# Patient Record
Sex: FEMALE | Race: WHITE | NOT HISPANIC OR LATINO | URBAN - METROPOLITAN AREA
[De-identification: names, ages, dates, MRNs, and addresses within clinical notes are randomized per-mention and may not be internally consistent; named-entity substitution may affect disease eponyms.]

---

## 2018-01-08 ENCOUNTER — EMERGENCY (EMERGENCY)
Facility: HOSPITAL | Age: 58
LOS: 1 days | Discharge: HOME | End: 2018-01-08
Attending: EMERGENCY MEDICINE | Admitting: EMERGENCY MEDICINE
Payer: COMMERCIAL

## 2018-01-08 VITALS
OXYGEN SATURATION: 98 % | TEMPERATURE: 98 F | HEIGHT: 65 IN | DIASTOLIC BLOOD PRESSURE: 86 MMHG | WEIGHT: 190.04 LBS | HEART RATE: 75 BPM | SYSTOLIC BLOOD PRESSURE: 146 MMHG | RESPIRATION RATE: 16 BRPM

## 2018-01-08 LAB
ALBUMIN SERPL ELPH-MCNC: 3.7 G/DL — SIGNIFICANT CHANGE UP (ref 3.3–5)
ALP SERPL-CCNC: 74 U/L — SIGNIFICANT CHANGE UP (ref 40–120)
ALT FLD-CCNC: 40 U/L — SIGNIFICANT CHANGE UP (ref 12–78)
ANION GAP SERPL CALC-SCNC: 8 MMOL/L — SIGNIFICANT CHANGE UP (ref 5–17)
AST SERPL-CCNC: 28 U/L — SIGNIFICANT CHANGE UP (ref 15–37)
BILIRUB SERPL-MCNC: 1.5 MG/DL — HIGH (ref 0.2–1.2)
BUN SERPL-MCNC: 13 MG/DL — SIGNIFICANT CHANGE UP (ref 7–23)
CALCIUM SERPL-MCNC: 8.4 MG/DL — LOW (ref 8.5–10.1)
CHLORIDE SERPL-SCNC: 106 MMOL/L — SIGNIFICANT CHANGE UP (ref 96–108)
CO2 SERPL-SCNC: 26 MMOL/L — SIGNIFICANT CHANGE UP (ref 22–31)
CREAT SERPL-MCNC: 0.76 MG/DL — SIGNIFICANT CHANGE UP (ref 0.5–1.3)
D DIMER BLD IA.RAPID-MCNC: <150 NG/ML DDU — SIGNIFICANT CHANGE UP
GLUCOSE SERPL-MCNC: 104 MG/DL — HIGH (ref 70–99)
HCT VFR BLD CALC: 38.4 % — SIGNIFICANT CHANGE UP (ref 34.5–45)
HGB BLD-MCNC: 13.1 G/DL — SIGNIFICANT CHANGE UP (ref 11.5–15.5)
INR BLD: 0.96 RATIO — SIGNIFICANT CHANGE UP (ref 0.88–1.16)
MCHC RBC-ENTMCNC: 32 PG — SIGNIFICANT CHANGE UP (ref 27–34)
MCHC RBC-ENTMCNC: 34 GM/DL — SIGNIFICANT CHANGE UP (ref 32–36)
MCV RBC AUTO: 94.3 FL — SIGNIFICANT CHANGE UP (ref 80–100)
PLATELET # BLD AUTO: 132 K/UL — LOW (ref 150–400)
POTASSIUM SERPL-MCNC: 3.8 MMOL/L — SIGNIFICANT CHANGE UP (ref 3.5–5.3)
POTASSIUM SERPL-SCNC: 3.8 MMOL/L — SIGNIFICANT CHANGE UP (ref 3.5–5.3)
PROT SERPL-MCNC: 6.6 G/DL — SIGNIFICANT CHANGE UP (ref 6–8.3)
PROTHROM AB SERPL-ACNC: 10.4 SEC — SIGNIFICANT CHANGE UP (ref 9.8–12.7)
RBC # BLD: 4.08 M/UL — SIGNIFICANT CHANGE UP (ref 3.8–5.2)
RBC # FLD: 12.4 % — SIGNIFICANT CHANGE UP (ref 10.3–14.5)
SODIUM SERPL-SCNC: 140 MMOL/L — SIGNIFICANT CHANGE UP (ref 135–145)
TROPONIN I SERPL-MCNC: <.015 NG/ML — SIGNIFICANT CHANGE UP (ref 0.01–0.04)
WBC # BLD: 5.2 K/UL — SIGNIFICANT CHANGE UP (ref 3.8–10.5)
WBC # FLD AUTO: 5.2 K/UL — SIGNIFICANT CHANGE UP (ref 3.8–10.5)

## 2018-01-08 PROCEDURE — 99284 EMERGENCY DEPT VISIT MOD MDM: CPT

## 2018-01-08 PROCEDURE — 93010 ELECTROCARDIOGRAM REPORT: CPT

## 2018-01-08 NOTE — ED ADULT NURSE NOTE - OBJECTIVE STATEMENT
pt reports had palpitations, went to stand by UC and had EKG sent to ED for further eval. states no CP/SHOB/palps at this time, lung and heart sounds normal

## 2018-01-09 VITALS
RESPIRATION RATE: 19 BRPM | SYSTOLIC BLOOD PRESSURE: 134 MMHG | HEART RATE: 72 BPM | DIASTOLIC BLOOD PRESSURE: 54 MMHG | OXYGEN SATURATION: 100 %

## 2018-01-09 LAB
TROPONIN I SERPL-MCNC: <.015 NG/ML — SIGNIFICANT CHANGE UP (ref 0.01–0.04)
TROPONIN I SERPL-MCNC: <.015 NG/ML — SIGNIFICANT CHANGE UP (ref 0.01–0.04)

## 2018-01-09 PROCEDURE — 85379 FIBRIN DEGRADATION QUANT: CPT

## 2018-01-09 PROCEDURE — 93005 ELECTROCARDIOGRAM TRACING: CPT

## 2018-01-09 PROCEDURE — 85610 PROTHROMBIN TIME: CPT

## 2018-01-09 PROCEDURE — 36415 COLL VENOUS BLD VENIPUNCTURE: CPT

## 2018-01-09 PROCEDURE — 85027 COMPLETE CBC AUTOMATED: CPT

## 2018-01-09 PROCEDURE — 80053 COMPREHEN METABOLIC PANEL: CPT

## 2018-01-09 PROCEDURE — 84484 ASSAY OF TROPONIN QUANT: CPT

## 2018-01-09 PROCEDURE — 71045 X-RAY EXAM CHEST 1 VIEW: CPT

## 2018-01-09 PROCEDURE — 71045 X-RAY EXAM CHEST 1 VIEW: CPT | Mod: 26

## 2018-01-09 PROCEDURE — 99284 EMERGENCY DEPT VISIT MOD MDM: CPT | Mod: 25

## 2018-01-09 NOTE — ED PROVIDER NOTE - MEDICAL DECISION MAKING DETAILS
pt with palpitations,normal ekg, no cardiac of pe risk factors, dd negative, check trop x 2,  d/c in neg and no arrythmia during stay, ellen rodríguez

## 2018-01-09 NOTE — ED PROVIDER NOTE - CHPI ED SYMPTOMS NEG
no syncope/no nausea/no dizziness/no fever/no vomiting/no back pain/no shortness of breath/no cough/no chest pain/no diaphoresis/no chills

## 2018-01-09 NOTE — ED PROVIDER NOTE - OBJECTIVE STATEMENT
pt is a 56 yo female awoke this am with racing palpitations, no dizziness, chest pain, shortness of breath, f/c, n/v, leg pain or swelling. pt denies prior such sx. no anxiety.  pt states sx constant.  no cardiac risk factors. no a/a factors

## 2020-02-27 NOTE — ED ADULT NURSE NOTE - GASTROINTESTINAL WDL
This will be her last prescription.  Please make sure that she schedule an appointment to establish care with new doctor.  She has been dismissed from our practice due to multiple no shows.   Abdomen soft, nontender, nondistended, bowel sounds present in all 4 quadrants.

## 2020-10-19 NOTE — ED ADULT TRIAGE NOTE - NSWEIGHTCALCTOOLDRUG_GEN_A_CORE
"Ellyn Mcgee is a 48 year old female who is being evaluated via a billable video visit.      The patient has been notified of following:     \"This video visit will be conducted via a call between you and your physician/provider. We have found that certain health care needs can be provided without the need for an in-person physical exam.  This service lets us provide the care you need with a video conversation.  If a prescription is necessary we can send it directly to your pharmacy.  If lab work is needed we can place an order for that and you can then stop by our lab to have the test done at a later time.    Video visits are billed at different rates depending on your insurance coverage.  Please reach out to your insurance provider with any questions.    If during the course of the call the physician/provider feels a video visit is not appropriate, you will not be charged for this service.\"    Patient has given verbal consent for Video visit? Yes  How would you like to obtain your AVS? MyChart  If you are dropped from the video visit, the video invite should be resent to: Text to cell phone: see Epic  Will anyone else be joining your video visit? No      Telemedicine Visit: The patient's condition can be safely assessed and treated via synchronous audio and visual telemedicine encounter.      Reason for Telemedicine Visit: Covid-19 Pandemic    Originating Site (Patient Location): Patient's home    Distant Site (Provider Location): Provider Remote Setting    Consent:  The patient/guardian has verbally consented to: the potential risks and benefits of telemedicine (video visit) versus in person care; bill my insurance or make self-payment for services provided; and responsibility for payment of non-covered services.     Mode of Communication:  Video Conference via Precogn[employer]    As the provider I attest to compliance with applicable laws and regulations related to telemedicine.        Outpatient Psychiatric " "Progress Note    Name: Ellyn Mcgee   : 1972                    Primary Care Provider: Dorothy Guaman DO   Therapist: CHOLO Lewis/Keesha Eller     PHQ-9 scores:  PHQ-9 SCORE 2020   PHQ-9 Total Score - - -   PHQ-9 Total Score MyChart 16 (Moderately severe depression) 7 (Mild depression) 8 (Mild depression)   PHQ-9 Total Score 16 7 8       TEE-7 scores:  TEE-7 SCORE 2020   Total Score - - -   Total Score - 14 (moderate anxiety) 12 (moderate anxiety)   Total Score 13 14 12       Patient Identification:  Patient is a 48 year old,   White American female  who presents for return visit with me.  Patient is currently employed part time. Patient attended the phone/video session alone. Patient prefers to be called: \"Ellyn\".    Interim History:  I last saw Ellyn Mcgee for outpatient psychiatry Consultation on 2020. During that appointment, we:      Continue Lexapro 10 mg daily for mood and anxiety    Increase Effexor-XR to 112.5 mg daily for mood and anxiety    Recommend therapy for additional support.     Today, patient reports she is 1 day shy of a hitting 30 weeks in her pregnancy.  Overall things are going okay.  She is starting to feel very uncomfortable physically.  She still able to move around some but gets fatigued and experiences some pain quite quickly.  Mood overall remains stable.  Anxiety is high at times but she is focusing very hard on nonmedication coping skills and strategies.  She continues to work hard in individual therapy.  She also reports she is doing couples therapy with her .  Sleep is difficult at times due to pregnancy and some discomfort.  Overall feels like her medication is in a good place.  She sometimes feels like she gets a little more anxious about an hour after taking her venlafaxine.  She does not seem to get as anxious if she forgets the 37.5 mg pill.  She is going to trial taking just the 75 mg pill on " "some days.  Would like to continue to focus on therapy without increasing medication.  Denies any thoughts of suicide.  No drug or alcohol use.    Per [facility] [place], Dr. Vincenzo Edward, during today's team-based visit:  Patient reported that she is feeling \"fine\" overall. She noted continuing to have moments of feeling reactive to situations. She said that she is able to maintain for a little longer than in the past but still has these moments. She has been working with a new therapist and noted that \"it's still early\" to determine how helpful it will be. She described these moments as smaller moments of \"panic\" but they are less frequent and less intense. \"I have more tolerance toward them now.\" She added that she does not believe more medications will help her depression and is putting her efforts into therapy to obtain some help with those symptoms. She spoke about the challenges with medication for anxiety while pregnant as she wants to be able to safely breastfeed.     Psychiatric ROS:  Ellyn Mcgee reports mood has been: Stable  Anxiety has been: Stable, heightened at times  Sleep has been: Difficult due to pregnancy  Isabel sxs: None  Psychosis sxs: None  ADHD/ADD sxs: manageable without meds  PTSD sxs: None  PHQ9 and GAD7 scores were reviewed today.   Medication side effects:  See HPI  Current stressors include: Pregnancy at age 48  Coping mechanisms and supports include: Therapy, Family, Hobbies and Friends    Current medications include:   Current Outpatient Medications   Medication Sig     albuterol (PROAIR HFA/PROVENTIL HFA/VENTOLIN HFA) 108 (90 Base) MCG/ACT inhaler Inhale 2 puffs into the lungs every 6 hours     escitalopram (LEXAPRO) 10 MG tablet Take 10 mg by mouth daily     IBUPROFEN as needed.     metroNIDAZOLE (FLAGYL) 500 MG tablet Take 1 tablet (500 mg) by mouth 2 times daily     miconazole (EQ MICONAZOLE 7) 2 % cream Place 1 applicator vaginally At Bedtime     Prenatal Vit-Fe Fumarate-FA (PRENATAL PO)      " venlafaxine (EFFEXOR-XR) 37.5 MG 24 hr capsule Take 1 capsule (37.5 mg) by mouth daily Take with 75 mg cap for total daily dose 112.5 mg.     venlafaxine (EFFEXOR-XR) 75 MG 24 hr capsule Take 1 capsule (75 mg) by mouth daily Take with 37.5 mg cap for total daily dose 112.5 mg.     No current facility-administered medications for this visit.        Past Medical/Surgical History:  Past Medical History:   Diagnosis Date     Abnormal Pap smear     Colposcopy     Adjustment disorder with mixed anxiety and depressed mood 4/4/2012     Anemia     In Past     Anxiety      Chlamydia trachomatis infection of other specified site 1993     Depression      Fertility problem      Lyme disease 9/8/2010    ?false positive vs positve CMV - resolved     Moderate dysplasia of cervix 1995     Other and unspecified adverse effect of drug, medicinal and biological substance     insulin resistent     Varicosities      Wounds and injuries     fall down stairs, PT for back, pain meds      has a past medical history of Abnormal Pap smear, Adjustment disorder with mixed anxiety and depressed mood (4/4/2012), Anemia, Anxiety, Chlamydia trachomatis infection of other specified site (1993), Depression, Fertility problem, Lyme disease (9/8/2010), Moderate dysplasia of cervix (1995), Other and unspecified adverse effect of drug, medicinal and biological substance, Varicosities, and Wounds and injuries. She also has no past medical history of Asthma, Asymptomatic human immunodeficiency virus (HIV) infection status (H), Breast disorder, Chronic kidney disease, Complication of anesthesia, Diabetes mellitus (H), Heart disease, Herpes simplex without mention of complication, Hypertension, Liver disease, Postpartum depression, Rh incompatibility, Seizures (H), Sickle cell anemia (H), Systemic lupus erythematosus (H), or Thyroid disease.    Social History:  Reviewed. No changes to social history.     Vital Signs:   None. This is phone/video visit.      Labs:  Most recent laboratory results reviewed and the pertinent results include:   Unremarkable UA 10/6/2020  Normal glucose tolerance test 1 month ago    Review of Systems:  10 systems (general, cardiovascular, respiratory, eyes, ENT, endocrine, GI, , M/S, neurological) were reviewed. Most pertinent finding(s) is/are: Aches and pains due to pregnancy. The remaining systems are all unremarkable.    Mental Status Examination (limited as this is by phone/video):  Appearance: Awake, alert, appears stated age, adequate hygiene/grooming, no acute distress  Attitude:  cooperative, pleasant  Motor: No gross abnormalities observed via video, not formally tested  Oriented to:  person, place, time, and situation  Attention Span and Concentration:  normal  Speech:  clear, coherent, regular rate, rhythm, and volume  Language: intact  Mood:  Okay  Affect:  appropriate and in normal range and mood congruent  Associations:  no loose associations  Thought Process:  logical, linear and goal oriented  Thought Content:  no evidence of suicidal ideation or homicidal ideation, no evidence of psychotic thought, no auditory hallucinations present and no visual hallucinations present  Recent and Remote Memory:  Intact to interview. Not formally assessed. No amnesia.  Fund of Knowledge: appropriate  Insight:  good  Judgment:  intact, adequate for safety  Impulse Control:  intact    Suicide Risk Assessment:  Today Ellyn Mcgee reports no suicidal ideation. Based on all available evidence including the factors cited above, Ellyn Mcgee does not appear to be at imminent risk for self-harm, does not meet criteria for a 72-hr hold, and therefore remains appropriate for ongoing outpatient level of care.  A thorough assessment of risk factors related to suicide and self-harm have been reviewed and are noted above. The patient convincingly denies suicidality on several occasions. Local community safety resources printed and reviewed for  patient to use if needed. There was no deceit detected, and the patient presented in a manner that was believable.     DSM5 Diagnosis:  Major Depressive Disorder, Recurrent Episode, In Partial Remission  300.02 (F41.1) Generalized Anxiety Disorder   ADHD, Unspecified    Medical comorbidities include:   Patient Active Problem List    Diagnosis Date Noted     Major depressive disorder, recurrent episode, moderate (H) 10/08/2020     Priority: Medium     High-risk pregnancy, elderly multigravida, unspecified trimester 06/05/2020     Priority: Medium     TEE (generalized anxiety disorder) 10/10/2018     Priority: Medium     Cervical radiculopathy 04/16/2018     Priority: Medium     Attention deficit hyperactivity disorder (ADHD), predominantly inattentive type 10/04/2017     Priority: Medium     Patient is followed by PREMA MARIEE for ongoing prescription of stimulants.  All refills should be approved by this provider, or covering partner.    Medication(s): Concerta 27mg on weekend days and 36mg other days of the week  Maximum quantity per month: 30  Clinic visit frequency required: Q 6  months     Controlled substance agreement on file: Yes       Date(s): 10/4/17  Neuropsych evaluation for ADD completed:  Yes, completed 8/17/17, on file and diagnosis confirmed    Last [facility] website verification: 12/3/2019   [email or web address]           External hemorrhoids 04/25/2012     Priority: Medium     PCOS (polycystic ovarian syndrome) 02/22/2011     Priority: Medium     Hyperlipidemia LDL goal <130 09/05/2008     Priority: Medium     Anxiety state 09/05/2008     Priority: Medium     Problem list name updated by automated process. Provider to review    Patient is followed by PREMA MARIEE for ongoing prescription of benzodiazepines.  All refills should be approved by this provider, or covering partner.    Medication(s): Xanax.   Maximum quantity per month:   Clinic visit frequency required:      Controlled substance  agreement on file: Yes       Date(s): 10/4/2017  Benzodiazepine use reviewed by psychiatry:      Last [facility] website verification:  done on 12/17/2018   [email or web address]             Psychosocial & Contextual Factors: See HPI above    Assessment:  Per Intake Note with me 9/8/20:  Ellyn Mcgee is a 48-year-old female who is 23 weeks pregnant with a past psychiatric history including depression, anxiety, and ADHD who presents today for psychiatric evaluation.  Her depression and anxiety date back several years and she has also had postpartum depression/anxiety with her now 8-year-old (she also has a 14-year-old but did not experience postpartum mental health issues at that time).  She started sertraline during her second pregnancy and then ended up being maintained on Paxil-CR for several years.  She is also more recently diagnosed with ADHD and maintained on Concerta.  She weaned off both Paxil and Concerta when she found out she was pregnant and replaced these medications with her current regimen of Lexapro and Effexor-XR to decrease risk of fetal defects.  For the most part, she is feeling a little bit better on her current doses of Lexapro 10 mg and Effexor-XR 75 mg.  It is an unconventional combination as we discussed, but since she is doing quite well, I am hesitant to make many changes.  She feels as though her anxiety could be a little bit better controlled and so we will further increase Effexor-XR to 112.5 mg daily to be taken with her Lexapro 10 mg daily.  If she does a lot better, we can consider decreasing Lexapro to 5 mg daily. We discussed the risk of serotonin syndrome and she will continue to be vigilant for any signs or symptoms of this condition.  We did discuss the possibility of restarting a stimulant medication if necessary.  She does not feel as though her ADHD symptoms are too severe at this time.    Today, 10/19, she reports overall doing okay today.  Feels like her mood is not  necessarily the best it has ever been but has been quite stable and overall improved since last couple visits.  Her anxiety is mostly manageable.  She is going to try decreasing her venlafaxine on some days to see if that helps improve some anxiety and low frustration tolerance she experiences about an hour after taking her dose.  We discussed trying to split her dose as an option as well.  She was encouraged to continue her good work in therapy.  No med changes today except that she might trial a lower dose of venlafaxine on some days.  Also discussed Escitalopram and venlafaxine's relative safety with breast-feeding.  We did discuss that it is transmitted through breast milk but that ultimately there is little evidence to show it is harmful to the baby.  Discussed monitoring baby's sleep and weight carefully.  Also discussed that it is okay to supplement or strictly formula feed if there are too many concerns.    Medication side effects and alternatives were reviewed. Health promotion activities recommended and reviewed today. All questions addressed. Education and counseling completed regarding risks and benefits of medications and psychotherapy options. Recommend ongoing therapy for additional support.     Treatment Plan:    Continue Lexapro/escitalopram 10 mg daily for mood and anxiety    Continue venlafaxine 112.5 mg daily for mood and anxiety.  You can trial just 75 mg on some days to see if that helps with some of the increased anxiety and irritability after you take your dose.  Could also try taking 37.5 mg at one time of the day and 75 mg at another time.    Continue all other medications as reviewed per electronic medical record today.     Safety plan reviewed. To the Emergency Department as needed or call after hours crisis line at [phone] or [phone]. [place] Crisis Text Line. Text [place] to 067195 or Suicide LifeLine Chat: suicidepreventionlifeline.org/chat    Continue therapy as planned.      Schedule an appointment with me in 8 weeks or sooner as needed. Call [facility] at [phone] to schedule if someone does not reach out to you within 2-3 days.     Follow up with primary care provider as planned or for acute medical concerns.    Call the psychiatric nurse line with medication questions or concerns at [phone].    MyChart may be used to communicate with your provider, but this is not intended to be used for emergencies.    [facility]- Psychiatric Disorders During Pregnancy  [email or web address]    [facility]  [email or web address]    Administrative Billing:   Phone Call/Video Duration: 27 Minutes  Start: 1:23p  Stop: 1:50p    Time spent with patient was 27 minutes and greater than 50% of time or 15 minutes was spent in counseling and coordination of care regarding above diagnoses and treatment plan. Complexity high due to high risk pregnancy due to maternal age. Also on psychiatric medication and hx of postpartum depression.     Patient Status:  Patient will continue to be seen for ongoing consultation and stabilization.    Signed:   Sherlyn Wang DO  [facility]    used

## 2021-01-07 ENCOUNTER — EMERGENCY (EMERGENCY)
Facility: HOSPITAL | Age: 61
LOS: 1 days | End: 2021-01-07
Attending: EMERGENCY MEDICINE
Payer: COMMERCIAL

## 2021-01-07 VITALS
SYSTOLIC BLOOD PRESSURE: 120 MMHG | OXYGEN SATURATION: 91 % | HEART RATE: 72 BPM | RESPIRATION RATE: 17 BRPM | TEMPERATURE: 97 F | DIASTOLIC BLOOD PRESSURE: 71 MMHG

## 2021-01-07 VITALS
HEIGHT: 65 IN | RESPIRATION RATE: 18 BRPM | WEIGHT: 250 LBS | TEMPERATURE: 97 F | DIASTOLIC BLOOD PRESSURE: 78 MMHG | OXYGEN SATURATION: 91 % | HEART RATE: 92 BPM | SYSTOLIC BLOOD PRESSURE: 123 MMHG

## 2021-01-07 LAB — SARS-COV-2 RNA SPEC QL NAA+PROBE: DETECTED

## 2021-01-07 PROCEDURE — 99283 EMERGENCY DEPT VISIT LOW MDM: CPT | Mod: 25

## 2021-01-07 PROCEDURE — 99283 EMERGENCY DEPT VISIT LOW MDM: CPT

## 2021-01-07 PROCEDURE — U0005: CPT

## 2021-01-07 PROCEDURE — U0003: CPT

## 2021-01-07 RX ORDER — DEXAMETHASONE 0.5 MG/5ML
1 ELIXIR ORAL
Qty: 4 | Refills: 0
Start: 2021-01-07 | End: 2021-01-10

## 2021-01-07 RX ORDER — DEXAMETHASONE 0.5 MG/5ML
6 ELIXIR ORAL ONCE
Refills: 0 | Status: COMPLETED | OUTPATIENT
Start: 2021-01-07 | End: 2021-01-07

## 2021-01-07 RX ADMIN — Medication 6 MILLIGRAM(S): at 17:10

## 2021-01-07 NOTE — ED PROVIDER NOTE - PHYSICAL EXAMINATION
Constitutional: Awake, Alert, non-toxic. NAD. Well appearing, well nourished.   HEAD: Normocephalic, atraumatic.   EYES: EOM intact, conjunctiva and sclera are clear bilaterally.   ENT: No rhinorrhea, patent, mucous membranes pink/moist, no drooling or stridor.   NECK: Supple, non-tender  CARDIOVASCULAR: Normal S1, S2; regular rate and rhythm.  RESPIRATORY: Normal respiratory effort; breath sounds CTAB, no wheezes, rhonchi, or rales. Speaking in full sentences. No accessory muscle use.   ABDOMEN: Soft; non-tender, non-distended.   EXTREMITIES: Full passive and active ROM in all extremities; non-tender to palpation; distal pulses palpable and symmetric, no LE edema.  SKIN: Warm, dry; good skin turgor, no apparent lesions or rashes, no ecchymosis, brisk capillary refill.  NEURO: A&O x3. Sensory and motor functions are grossly intact. Speech is normal. Appearance and judgement seem appropriate for gender and age.

## 2021-01-07 NOTE — ED ADULT TRIAGE NOTE - AS HEIGHT TYPE
stated
decreased ability to use arms for pushing/pulling/decreased ability to use legs for bridging/pushing

## 2021-01-07 NOTE — ED PROVIDER NOTE - NSFOLLOWUPINSTRUCTIONS_ED_ALL_ED_FT
You are being sent home at this time, but you need to put yourself on HOME ISOLATION.  It is currently recommended at this time that you isolate for the next 14 days unless further instructions are provided at a later date.  When home on home isolation please try to use your own bathroom and bedroom, in an effort to prevent the spread to anyone in your household.  Continue Tylenol per label instructions as needed for fever and body aches  Advance activity as tolerated  Please return to the ED for increased difficulty breathing or signs of respiratory distress, unable to eat / drink, dizziness , passing out, chest pains, or any other concerning symptoms.          The following are general guidelines:  •Stay home if you are sick or think you may have COVID-19. It is important to stay home if you are waiting for a testing appointment or for test results. Even if you do not have symptoms, you can pass the virus to others.      •Limit trips out of your home. You may be able to have food, medicines, and other supplies delivered. If possible, have delivered items left at your door or other area. Try not to have someone hand you an item. You will be so close to the person that the virus can spread between you. Plan trips out of your home. Think about how many people you may have contact with, and for how long. Plan your route so you make the fewest stops possible to limit contact. Keep track of places you go and anyone you have contact with. This will help contact tracers notify others if you become infected.      •Do not have close physical contact with anyone who does not live in your home. Do not shake hands with, hug, or kiss a person as a greeting. Stand or walk as far from others as possible. If you must use public transportation (such as a bus or subway), try to sit or stand away from others. You can stay safely connected with others through phone calls, e-mail messages, social media websites, and video chats. Check in on anyone who may be having a hard time socially distancing, or who lives alone. Ask administrators at nursing homes or long-term care facilities how you can safely communicate with someone living there.      •Wear a face covering (mask) around anyone who does not live in your home. A covering helps protect the person wearing it from being infected or passing the virus to others. Do not wear a plastic face shield instead of a covering. You can use both together for extra protection. Use a disposable non-medical mask, or make a cloth covering with at least 2 layers. Cover your mouth and your nose. Securely fasten it under your chin and on the sides of your face. A face covering is not a substitute for other safety measures. Continue social distancing and washing your hands often. Do not put coverings on children younger than 2 years or on anyone who has breathing problems or cannot remove it. Talk to your healthcare provider if you or someone you care for cannot wear a face covering.      •Only allow medical professionals or other necessary helpers into your home. Wear your face covering, and remind others to wear a face covering. Remind them to wash their hands when they arrive and before they leave. Do not let a visitor into your home, even if the person is not sick. A person can pass the virus to others before symptoms of COVID-19 begin. Some people never even develop symptoms. Children commonly have mild symptoms or no symptoms. It may be hard to tell a child not to hug or kiss you. Explain that this is how he or she can help you stay healthy.      •Do not go to someone else's home unless it is necessary. Do not go over to visit, even if the person is lonely. Only go if you need to help him or her. Make sure you both wear face coverings while you are there.      •Avoid large gatherings and crowds. Gatherings or crowds of 10 or more individuals can cause the virus to spread. Examples of gatherings include parties, holiday meals, sporting events, Advent services, and conferences. Crowds may form at beaches, justice, and tourist attractions. Protect yourself by staying away from large gatherings and crowds.      •Ask your healthcare provider for other ways to have appointments. You may be able to have appointments without having to go into the provider's office. Some providers offer phone, video, or other types of appointments. You may also be able to get prescriptions for a few months of your medicines at a time.      •Stay safe if you must go out to work. You may have a job that can only be done outside your home. Keep physical distance between you and other workers as much as possible. Follow your employer's rules so everyone stays safe.      If you have COVID-19 and are recovering at home: Healthcare providers will give you specific instructions to follow. The following are general guidelines to remind you how to keep others safe until you are well:   •Wash your hands often. Use soap and water as much as possible. You can use hand  that contains alcohol if soap and water are not available. Do not share towels with anyone. If you use paper towels, throw them away in a lined trash can kept in your room or area. Use a covered trash can, if possible.      •Do not go out of your home unless it is necessary. You may have to go to your healthcare provider's office for check-ups or to get prescription refills. Do not arrive at the provider's office without an appointment. Providers have to make their offices safe for staff and other patients. If possible, ask someone who is not infected to go out for what you need. This includes groceries, medicines, and household items.      •Only have close physical contact with a person giving direct care, or a baby or child you must care for. Family members and friends should not visit you. If possible, stay in a separate area or room of your home if you live with others. No one should go into the area or room except to give you care. You can visit with others by phone, video chat, e-mail, or similar systems. It is important to stay connected with others in your life while you recover.      •Wear a face covering while others are near you. This can help prevent droplets from spreading the virus when you talk, sneeze, or cough. Put the covering on before anyone comes into your room or area. Remind the person to cover his or her nose and mouth before going in to provide care for you.      •Do not share items. Do not share dishes, towels, or other items with anyone. Items need to be washed after you use them.      •Protect your baby. Wash your hands with soap and water often throughout the day. Wear a clean face covering while you breastfeed, or while you express or pump breast milk. If possible, ask someone who is well to care for your baby. You can put breast milk in bottles for the person to use, if needed. Talk to your healthcare provider if you have any questions or concerns about caring for or bonding with your baby. He or she will tell you when to bring your baby in for check-ups and vaccines. He or she will also tell you what to do if you think your baby was infected with the new virus.      •Do not handle live animals unless it is necessary. Until more is known, it is best not to touch, play with, or handle live animals. Some animals, including pets, have been infected with the new coronavirus. Do not handle or care for animals until you are well. Care includes feeding, petting, and cuddling your pet. Do not let your pet lick you or share your food. Ask someone who is not infected to take care of your pet, if possible. If you must care for a pet, wear a face covering. Wash your hands before and after you give care. Talk to your healthcare provider about how to keep a service animal safe, if needed.      •Follow directions from your healthcare provider for being around others after you recover. It is not known for sure if or for how long a recovered person can pass the virus to others. Your provider may give you instructions, such as continuing social distancing or wearing a face covering around others. The following are general guidelines for when you can be around others:?If you never developed any symptoms, wait at least 10 days after your positive test. Your provider may want you to have 2 negative tests in a row at least 24 hours apart. This depends on how available testing is in your area.      ?If you did have symptoms, wait at least 10 days after the symptoms first appeared. Then you will need to have no fever for 24 hours without fever medicine. Most of your symptoms will also need to be gone. A loss of taste or smell may continue for several months. It is considered okay to be around others if this is your only symptom.      ?If you were hospitalized for COVID-19 and needed oxygen, your provider will tell you how long to wait. You may need to wait until 20 days after symptoms appeared. It may be less if you have 2 negative tests in a row at least 24 hours apart. This will depend on how available testing is in your area.        How to take care of someone who has COVID-19: If the person lives in another home, arrange for a time to give care. Remember to bring a few pairs of disposable gloves and a cloth face covering. The following are general guidelines to help you safely care for anyone who has COVID-19:  •Wash your hands often. Wash before and after you go into the person's home, area, or room. Throw paper towels away in a lined trash can that has a lid, if possible.      •Help the person so he or she does not expose others to the virus. You will also help the person rest by doing housework, cooking meals, and running errands for him or her. Go out for groceries, medicines, or household items the person needs. Make sure he or she drinks extra liquids and eats healthy foods. Watch for worsening symptoms. Keep his or her healthcare provider's contact information where you can easily find it. Contact the provider if you notice a symptom is getting worse. The provider will tell you what to do.      •Do not allow others to go near the person. No one should come into the person's home unless it is necessary. If possible, the person should be in a separate area or room if he or she lives with others. Keep the room's door shut unless you need to go in or out. Have others call, video chat, or e-mail the person if he or she is feeling well enough. The person may feel lonely if he or she is kept separate for a long period of time. Safe communication can help him or her stay connected to family and friends.      •Make sure the person's room has good air flow. You may be able to open the window if the weather allows. An air conditioner can also be turned on to help air move.      •Contact the person before you go in to give care. Make sure the person is wearing a face covering. Remind him or her to wash his or her hands with soap and water. He or she can use hand  that contains alcohol if soap and water are not available. Put on a face covering before you go in to give care.      •Wear gloves while you give care and clean. Clean items the person uses often. Clean countertops, cooking surfaces, and the fronts and insides of the microwave and refrigerator. Clean the shower, toilet, the area around the toilet, the sink, the area around the sink, and faucets. Gather used laundry or bedding. Wash and dry items on the warmest settings the fabric allows. Wash dishes and silverware in hot, soapy water or in a .      •Anything you throw away needs to go into a lined trash can. When you need to empty the trash, close the open end of the lining and tie it closed. This helps prevent items the virus is on from spilling out of the trash. Remove your gloves and throw them away. Wash your hands.      •Follow your healthcare provider's instructions. You will need to quarantine while you are caring for the infected person. You should also be tested, even if you do not develop symptoms of COVID-19. These measures will help protect others you are around while you are giving care. Your provider will give you instructions for quarantining and testing.      Follow up with your doctor as directed: Write down your questions so you remember to ask them during your visits.    For more information:   •Centers for Disease Control and Prevention  [address] [place] [address]  Phone: 1-279.890.2981  Web Address: http://www.cdc.gov

## 2021-01-07 NOTE — ED PROVIDER NOTE - OBJECTIVE STATEMENT
61 y/o female without reported PMHx sent by urgent care due to SOB. pt reports her  tested positive for COVID 12/21. pt reports she became symptomatic 12/23. Pt reports she tested 12/23 in which was negative. pt reports she initially had body aches, mild cough, diarrhea, and SOB which has improved since. pt reports she went to urgent care to get tested for COVID along with antibodies in which CXR noted pneumonia and had low oxygen levels. pt reports feeling minimally SOB but comfortably. pt denies chest pain, fever, hemoptysis, leg swelling, hx of DVT/CAD, N/V/D recently, abdominal pain, or any other complaints.

## 2021-01-07 NOTE — ED PROVIDER NOTE - NSFOLLOWUPCLINICS_GEN_ALL_ED_FT
[facility] Pulmonolgy and Sleep Medicine  Pulmonology  [address] [place] [address], [facility] [address]  [place] [address]  Phone: [phone]  Fax:   Follow Up Time: 1-3 Days

## 2021-01-07 NOTE — ED PROVIDER NOTE - CLINICAL SUMMARY MEDICAL DECISION MAKING FREE TEXT BOX
sent by urgent care due to SOB. pt reports her  tested positive for COVID 12/21. pt reports she became symptomatic 12/23. Pt reports she tested 12/23 in which was negative. pt reports she initially had body aches, mild cough, diarrhea, and SOB which has improved since. pt reports she went to urgent care to get tested for COVID along with antibodies in which CXR noted pneumonia and had low oxygen levels. Vitals noted mild hypoxia. Pt was ambulated, mild hypoxia in low 90s. Pt reports she is feeling comfortable, would not like work up or admission. Requesting steroid treatment as per urgent care recommendations. Rx for decadron.

## 2021-01-07 NOTE — ED PROVIDER NOTE - PATIENT PORTAL LINK FT
You can access the FollowMyHealth Patient Portal offered by [facility] by registering at the following website: [email or web address]. By joining [employer]’s FollowMyHealth portal, you will also be able to view your health information using other applications (apps) compatible with our system.

## 2021-01-07 NOTE — ED PROVIDER NOTE - ATTENDING CONTRIBUTION TO CARE
59 yo F p/w co cough / congestion x past  ~ 2 weeks. Pt with exposure to COVID - but tested neg several days after developing sx. Now with some hypoxia - pt sent for eval. no chest pain. no abd pain. no v/d. No neck / back pain. No neck / back pain. No other acute co.  exam: MM Moist. neck supple. no meningeal signs. O2 sat 91, with 89% with exertion. Pt otherwise looking well. non-toxic, well appearing. nl resp effort. no acc muscle use. no other acute findings.  Dw pt - refusing workup- will start decadron and will fu with PMD asap. Pt will return with any changes, pt will check pulseox at home and will return with worsening / persistent hypoxia, worsening dyspnea or any other concerns.

## 2021-02-24 ENCOUNTER — NON-APPOINTMENT (OUTPATIENT)
Age: 61
End: 2021-02-24

## 2021-02-24 ENCOUNTER — APPOINTMENT (OUTPATIENT)
Dept: CARDIOLOGY | Facility: CLINIC | Age: 61
End: 2021-02-24
Payer: COMMERCIAL

## 2021-02-24 VITALS
HEART RATE: 79 BPM | OXYGEN SATURATION: 98 % | HEIGHT: 64 IN | DIASTOLIC BLOOD PRESSURE: 86 MMHG | BODY MASS INDEX: 42.51 KG/M2 | SYSTOLIC BLOOD PRESSURE: 134 MMHG | WEIGHT: 249 LBS

## 2021-02-24 DIAGNOSIS — U07.1 COVID-19: ICD-10-CM

## 2021-02-24 DIAGNOSIS — R00.2 PALPITATIONS: ICD-10-CM

## 2021-02-24 DIAGNOSIS — R03.0 ELEVATED BLOOD-PRESSURE READING, W/OUT DIAGNOSIS OF HYPERTENSION: ICD-10-CM

## 2021-02-24 PROCEDURE — 93000 ELECTROCARDIOGRAM COMPLETE: CPT

## 2021-02-24 PROCEDURE — 99072 ADDL SUPL MATRL&STAF TM PHE: CPT

## 2021-02-24 PROCEDURE — 99204 OFFICE O/P NEW MOD 45 MIN: CPT

## 2022-08-12 ENCOUNTER — APPOINTMENT (OUTPATIENT)
Dept: INTERNAL MEDICINE | Facility: CLINIC | Age: 62
End: 2022-08-12

## 2023-09-04 ENCOUNTER — TRANSCRIPTION ENCOUNTER (OUTPATIENT)
Age: 63
End: 2023-09-04

## 2025-05-01 NOTE — ED ADULT NURSE NOTE - CAS EDN DISCHARGE ASSESSMENT
Discharge Summary    Patient ID:  Yolie Roman  [id number]  69 year old  1956    Admit date: 4/23/2025    Discharge date:  5/1/2025      Admitting Physician: Jay Dang DO     Discharge Physician: Bob Titus MD    Primary Diagnoses:   Principal Problem:    CAD in native artery  Resolved Problems:    * No resolved hospital problems. *      Secondary Diagnoses:  Past Medical History:   Diagnosis Date    Anxiety     Brugada syndrome 10/05/2016    Chronic depression 03/03/2017    CKD (chronic kidney disease), stage III  (CMD) 10/14/2015    Coronary artery disease 04/23/2025    Diabetes mellitus  (CMD)     Essential (primary) hypertension     Excessive falling 01/17/2017    GERD without esophagitis 02/17/2017    Heart failure with mildly reduced ejection fraction (HFmrEF)  (CMD) 04/21/2025    Hyperlipidemia 04/15/2016    NSTEMI (non-ST elevated myocardial infarction)  (CMD) 04/21/2025    Obesity     Osteopenia 02/01/2024    Peripheral arterial disease (CMD) 03/18/2025    Presbyopia     Single Chamber ICD [employer] 10/05/2016    Syncope 10/04/2016    Vitreous floaters of right eye            Hospital Course By Problem List (see H&P for details of admission):  69-year-old female who presented to [facility] with chest pain. Found to have severe diffuse CAD requiring CABG. Transferred here for CABG with Dr. Gaona however after further evaluation patient was considered high risk for CABG and recommended high risk PTCA instead which was done and tolerated well     Today   No more confusion, oriented x 4  Improving cough with less green sputum   No more SOB and weaned off O2   No chest pain   No fever or chills   No abdominal pain, nausea or vomiting    NSTEMI with diffuse severe multivessel CAD S/P high risk multiple PTCA on 4/29/25  Ischemic cardiomyopathy  - Presented with chest pain and elevated troponin.  Underwent cardiac catheterization 4/22 showing proximal % stenosis, mid left main 70%  stenosis, mid LAD 85% stenosis and mid circumflex 50% stenosis.  - Case was discussed with Dr. Figueroa Gaona as well as cardiology, Dr. Ambriz.  Initial plan was to proceed with CABG however patient felt to be high risk, after further discussions between Cardiology and Cardiothoracic surgery decided to go for high risk PTCA which was done on 4/29/25  -Echocardiogram from 4/22 showed EF 42-48% with mid and distal anterior septal hypokinesis and grade 2 diastolic dysfunction  - Troponin peaked at 882 then down to 580.    Received heparin drip initially.  Currently without chest pain  Continue on aspirin, statin and added Plavix  Added Toprol    Continue Doxazosin   Reduced home Amlodipine to avoid hypotension   Continue Bumex   No ACEI or ARBs because of renal functions, can consider initiating as outpatient if stable   Continue to follow with Cardiology as outpatient at [facility]      Acute bronchitis and mild volume overload, resolving   Acute hypoxic respiratory failure, secondary to the above, resolved   -Presented from outside facility with cough and some dyspnea.    -  CTA chest from 4/21 commented on peripheral atelectasis.  No consolidation, effusion, mass  Sputum culture positive for hemophilus influenza    Started on Azithromycin planning to complete total of 3 days course   Resume Bumex      Metabolic encephalopathy resolved   Secondary to the above   Added Thiamine   Checked B12 and folic acid levels and okay    Avoid opiates and sedatives      Poorly controlled diabetes mellitus type 2  -Lantus increased over past few days.  Improved control now on Lantus 38 units, lispro 10 units scheduled with close monitoring of BS..    -A1c measured at 11.4  -Prior to arrival, patient was on Lantus 30 units in the morning      Essential hypertension   Continue medications as before, monitor BP     Chronic kidney disease stage III-   -Stable  Monitor I's and O's, renal function throughout course     Depression/anxiety-    Continue home medications       GERD-   Continue PPI     Brugada syndrome  Single-chamber ICD placed.  Monitor on on telemetry     Lower extremity vascular insufficiency  Claudication  Follows with vascular surgery outpatient     Depression and anxiety continue home medications                      Consults   IP Consult Orders (From admission, onward)       Start     Ordered    04/25/25 1348  Inpatient consult to Cardiology  ONE TIME        Provider:  Robert Ambriz DO    04/25/25 1347    04/24/25 0747  Inpatient consult to Cardiothoracic Surgery  ONE TIME        Provider:  Figueroa Gaona MD    04/24/25 0746                    Procedures performed [facility] LOWER EXTREMITY VENOUS DUPLEX MAPPING BILATERAL  Result Date: 4/25/2025  [facility] LOWER EXTREMITY VENOUS DUPLEX MAPPING BILATERAL INDICATION:  Preoperative vein mapping. TECHNIQUE:  Bilateral venous duplex sonography (grayscale, color flow, spectral Doppler) was performed. FINDINGS:  RIGHT LEG: Deep venous system: normally compressible throughout, with no evidence for deep vein thrombosis. No deep vein reflux identified. Superficial venous system: GSV, at SFJ: 7.5 mm GSV, proximal thigh: 7.5 mm GSV, mid thigh: 7.2 mm At lower thigh level, a large varicose tributary exits the saphenous sheath GSV, distal thigh: 3.6 mm GSV, proximal calf: 2.3 mm GSV, midcalf: 2.6 mm GSV, distal calf: 2.9 mm GSV, ankle: 2.6 mm SSV, upper calf: 1.6 mm SSV, midcalf: 1.9 mm SSV, lower calf: 1.9 mm LEFT LEG: Deep venous system: normally compressible throughout, with no evidence for deep vein thrombosis. No deep vein reflux identified. Superficial venous system: GSV, at SFJ: 12.6 mm GSV, proximal thigh: 8.9 mm GSV, mid thigh: 6.3 mm At mid to lower thigh, there is a large varicose tributary communicating with numerous varicosities GSV, distal thigh: 1.6 mm GSV, proximal calf: Varicosities only GSV, midcalf: Varicosities only GSV, distal calf: Varicosities only GSV, ankle: Varicosities  only SSV, upper calf: 1.3 mm, partial compressibility with wall thickening SSV, midcalf: 2.4 mm SSV, lower calf: 1.7 mm     IMPRESSION: Right le. No evidence for deep vein thrombosis. 2. Patent and continuous great saphenous vein from saphenofemoral junction to the ankle. The upper and mid thigh great saphenous segment is dilated with a large varicose tributary exiting lower at lower thigh. 3. Patent and continuous small saphenous vein from upper calf to ankle with minimum diameter 1.6 mm. Left le. No evidence for deep vein thrombosis. 2. Patent and continuous great saphenous vein from saphenofemoral junction to the knee. The upper and mid great saphenous segment is quite dilated, communicating with numerous subcutaneous varicosities via a mid to lower thigh varicose tributary. 3. Chronic postthrombotic wall thickening and diminutive nature of the upper aspect of the small saphenous vein suggests remote thrombotic event; the vein is of more normal caliber at mid and distal calf. Electronically Signed by: Barrett Mckeon MD Signed on: 2025 10:03 AM Created on Workstation ID: [id number] Signed on Workstation ID: [id number]    XR CHEST AP OR PA  Result Date: 2025  EXAM: XR CHEST AP OR PA INDICATION:  hypoxia/dyspnea COMPARISON: 2025. FINDINGS/    IMPRESSION:  Stable borderline prominence of the heart size and central vasculature, which can be seen with cardiac decompensation. Left-sided pacemaker remains in place. There is mild interstitial prominence without evidence of dense focal consolidation or significant effusion. Electronically Signed by: Daquan Garcia MD Signed on: 2025 3:32 PM Created on Workstation ID: [id number] Signed on Workstation ID: [id number]    US VASC CAROTID DUPLEX BILATERAL  Result Date: 2025  BILATERAL CAROTID DUPLEX ULTRASOUND INDICATION: Preoperative COMPARISON:  None. TECHNIQUE:  Bilateral carotid sonography was performed with gray-scale, color-flow, Doppler  waveform, and velocity analysis.  Stenosis categorization conforms to NASCET criteria, which utilize the distal internal carotid artery as the reference segment. FINDINGS: In the right neck, moderate plaque is present at the carotid bulb and proximal internal carotid artery.  Flow patterns remain within normal limits, with peak systolic velocities in the common and internal carotid segments measuring 106 cm/sec and 121 cm/sec, respectively.  No significant diastolic velocity elevation is present.  The right ICA/CCA ratio is 1.1.  In the left neck, moderate plaque is present at the carotid bulb and proximal internal carotid artery. Peak systolic velocities in the common and internal carotid segments measuring 90 cm/sec and 166 cm/sec, respectively.  No significant diastolic velocity elevation is present.  The left ICA/CCA ratio is 1.8.  Antegrade vertebral arterial flow is present bilaterally.     IMPRESSION:   Moderate bilateral carotid plaque.  By standardized velocity criteria, there is 1%- 49 % disease on the right and 50 to 69% disease on the left.  Continued yearly carotid duplex surveillance is recommended to monitor for progression of disease. Electronically Signed by: Daquan Garcia MD Signed on: 4/24/2025 12:07 PM Created on Workstation ID: [id number] Signed on Workstation ID: [id number]    Cath/PV Case  Result Date: 4/23/2025  Images from the original result were not included.   Prox RCA lesion with 100% stenosis.   Mid LM lesion with 70% stenosis.   Mid LAD lesion with 85% stenosis.   Mid Cx lesion with 50% stenosis. 70 yo female with severely calcified coronaries RCA  and severe LM and LAD lesions DM Reviewed with Dr Figueroa Gaona and agree to CABG Pre-op Dx: NSTEMI Post-op Dx: Severe LM and RCA Procedure:  US for access, Coronary angiogram, and IVUS Recommendations / Disposition May be discharged after bed rest if ambulating without symptoms and entry sites are stable. GDMT Transfer to [facility] for CABG  Antiplatelet therapy High-intensity statin as tolerated Monitor entry site and renal function post procedure and IV fluids post contrast Beta-blocker Surgeon:  Elijah Zepeda MD Assistants:  None Anesthesia Type:  Local and IV Sedation Findings: LM 70% IVUS 5.1 mm sq LAD 85% CX 50% %  Description:  R radial access with US guidance. Vascular band post procedure with good hemostasis. Estimated Blood Loss:  Less than 20 cc Complications:    None Specimens Removed:  None Implants and Grafts:    None Antiplatelet recommendations to CV Surgeon Does the patient have ACS? Yes: Is patient being recommended for CABG (coronary artery bypass graft)? Yes Continue aspirin pre-op. D/C P2Y12 inhibitors (prasugrel, clopidogrel, ticagrelor) per ACC guidelines pre-op. Resume P2Y12 inhibitors (prasugrel, clopidogrel, ticagrelor) post-op when bleeding is diminished, unless patient develops post-op a-fib and then contact cardiology for recommendations.       Discharge Exam    Blood pressure 130/72, pulse 65, temperature 98.5 °F (36.9 °C), temperature source Oral, resp. rate 20, height 5' 2\" (1.575 m), weight 85.9 kg (189 lb 6.4 oz), SpO2 94%, not currently breastfeeding.    General:  No acute distress.  HEENT: Normocephalic, atraumatic, PERRL, intact extraocular movement.  Neck:  Trachea is midline. No adenopathy.    Cardiovascular:  Regular rate and rhythm, normal S1, S2, no added sounds or murmurs.  Respiratory: no more bibasilar rales or wheezing   gastrointestinal:  Soft, nontender and nondistended, no hepatomegaly or splenomegaly. bowel sounds present.  Neuro:   Alert and Oriented to time, place, person. CN II-XII intact. no focal weakness or sensory deficits.  Psychiatric:   Cooperative.  Appropriate mood & affect.  Normal judgment.  Skin:  Warm and dry without rash.  Extremities: No pitting edema of the lower extremities bilaterally, distal pulses intact.    Activity: as tolerated   Diet: cardiac     Code Status: Full  Resuscitation    Pending issues to be followed up by PCP    Please follow with cardiology   Monitor BP and adjust medications as needed  Monitor BS and adjust insulin doses if needed   Recheck CBC and CMP in 1 week      Discharge Medications     Summary of your Discharge Medications        Take these Medications        Details   ALPRAZolam 0.5 MG tablet  Commonly known as: XANAX   Take 1 tablet by mouth 3 times daily as needed for anxiety.  Comment: This prescription was filled on 2/11/2025. Any refills authorized will be placed on file.     amLODIPine 2.5 MG tablet  Commonly known as: NORVASC  Start taking on: May 2, 2025   Take 1 tablet by mouth daily.     ascorbic acid 250 MG tablet  Commonly known as: Vitamin C   Take 250 mg by mouth daily.     aspirin 81 MG EC tablet  Commonly known as: ECOTRIN  Start taking on: May 2, 2025   Take 1 tablet by mouth daily.     atorvastatin 80 MG tablet  Commonly known as: LIPITOR   Take 1 tablet by mouth daily.  Comment: New dose     bumetanide 1 MG tablet  Commonly known as: BUMEX   Take 1 tablet by mouth daily.     buPROPion 100 MG 12 hr tablet  Commonly known as: WELLBUTRIN SR   Take 1 tablet by mouth 2 times daily.  Comment: This prescription was filled on 2/11/2025. Any refills authorized will be placed on file.     busPIRone 5 MG tablet  Commonly known as: BUSPAR   Take 1 tablet by mouth 2 times daily.  Comment: This prescription was filled on 4/7/2025. Any refills authorized will be placed on file.     Cholecalciferol 50 mcg (2,000 units) tablet   Take 1 tablet by mouth daily.  Comment: 50 mcg = 2,000 units     clopidogrel 75 MG tablet  Commonly known as: PLAVIX  Start taking on: May 2, 2025   Take 1 tablet by mouth daily.     doxazosin 2 MG tablet  Commonly known as: CARDURA   Take 1 tablet by mouth nightly.     EPINEPHrine 0.3 MG/0.3ML auto-injector  Commonly known as: EpiPen 2-Salvador   Inject 0.3 mLs into the muscle 1 time as needed for Anaphylaxis.     ezetimibe 10 MG  tablet  Commonly known as: ZETIA   Take 1 tablet by mouth daily.     ferrous sulfate 325 (65 FE) MG tablet  Commonly known as: FeroSul   Take 1 tablet by mouth daily (with breakfast).  Comment: We would like to send bubble packs by the end of the week. Please send us a new perscription. Thank you!     gabapentin 300 MG capsule  Commonly known as: NEURONTIN   Take 1 capsule by mouth 3 times daily  Comment: This prescription was filled on 2/11/2025. Any refills authorized will be placed on file.     imipramine 50 MG tablet  Commonly known as: TOFRANIL   Take 2 tablets by mouth nightly.  Comment: This prescription was filled on 2/11/2025. Any refills authorized will be placed on file.     Insulin Lispro (1 Unit Dial) 100 UNIT/ML pen-injector  Commonly known as: HumaLOG KwikPen   Inject 10 units into the skin 3 times per day before meals. Prime 2 units before each dose.  If not eating a meal or pre meal blood sugar is less than 80 mg/dL do not give insulin.     Insulin Pen Needle 32G X 4 MM Misc  Commonly known as: NovoFine Plus Pen Needle   Use to inject insulin four times daily. Remove needle cover(s) to expose needle before injecting.     lactulose 10 GM/15ML solution  Commonly known as: CHRONULAC   Take 15 mLs by mouth 3 times daily as needed (Constipation).     Lantus SoloStar 100 UNIT/ML pen-injector   Generic drug: insulin glargine  Indications: Type 2 Diabetes Prime 2 units before each dose... inject 38 units subcutaneous every am     Magnesium Oxide 400 MG Cap   Take 400 mg by mouth in the morning and 400 mg in the evening.     metoPROLOL succinate 25 MG 24 hr tablet  Commonly known as: TOPROL-XL  Start taking on: May 2, 2025   Take 1 tablet by mouth daily.     ondansetron 4 MG disintegrating tablet  Commonly known as: ZOFRAN ODT   Place 1 tablet onto the tongue every 6 hours as needed for Nausea.  Comment: REFILL REQUEST FOR FUTURE FILL     pantoprazole 40 MG tablet  Commonly known as: PROTONIX   Take 1 tablet  by mouth daily.     PARoxetine 20 MG tablet  Commonly known as: PAXIL   Take 2 & 1/2 tablets by mouth nightly  Comment: This prescription was filled on 2/11/2025. Any refills authorized will be placed on file.     Salonpas Lidocaine Plus 4-10 % Cream   Generic drug: Lidocaine HCl-Benzyl Alcohol  Apply 1 Application topically 2 times daily as needed (pain).     SALONPAS PAIN RELIEF PATCH EX   Apply 1 patch topically daily as needed (pain).     thiamine 100 MG tablet  Commonly known as: VITAMIN B1  Start taking on: May 2, 2025   Take 1 tablet by mouth daily.     VITAMIN B COMPLEX PO   Take 1 tablet by mouth daily.              Follow-up with:    [facility]  [address] [id number] [phone]        Carmela Lopez MD  [address] [place] [address]  [phone]    Follow up in 1 week(s)      Lucein Beauchamp DO  [address] [place] [address]  [phone]    Follow up in 2 week(s)        Time spent on discharge was greater than 30 minutes    Signed:    Bob Titus MD  5/1/2025  11:38 AM     Alert and oriented to person, place and time